# Patient Record
Sex: MALE | Race: WHITE
[De-identification: names, ages, dates, MRNs, and addresses within clinical notes are randomized per-mention and may not be internally consistent; named-entity substitution may affect disease eponyms.]

---

## 2018-04-30 ENCOUNTER — HOSPITAL ENCOUNTER (EMERGENCY)
Dept: HOSPITAL 80 - FED | Age: 68
Discharge: HOME | End: 2018-04-30
Payer: COMMERCIAL

## 2018-04-30 VITALS — DIASTOLIC BLOOD PRESSURE: 85 MMHG | SYSTOLIC BLOOD PRESSURE: 160 MMHG

## 2018-04-30 DIAGNOSIS — R09.1: Primary | ICD-10-CM

## 2018-04-30 LAB — PLATELET # BLD: 167 10^3/UL (ref 150–400)

## 2018-04-30 NOTE — EDPHY
H & P


Stated Complaint: EXPOSURE TO WEED KILLER YESTERDAY/BURNING/CP WITH DEEP BREATH/

EYES BURNING


Time Seen by Provider: 04/30/18 18:25


HPI/ROS: 





CHIEF COMPLAINT:  Multiple complaints including dyspnea, burning eyes and nose 

after being exposed to a weed killer





HISTORY OF PRESENT ILLNESS:  The patient presents to the ED with a 2 day 

history of burning eyes, nose and mild dyspnea after being exposed to a weed 

killer 2 days ago.  The patient denies any prior history of cardiac disease.  

He has no history of exertional chest pain or shortness of breath.  The patient 

takes no regular medications.  The patient is also having symptoms of a slight 

dry mouth, anxious and symptoms of fatigue.  The patient denies fever or cough.

  He has no complaints of congestion.  The patient denies additional acute 

complaints.  He does have a history of mild chronic diarrhea.





REVIEW OF SYSTEMS:


A comprehensive 10 point review of systems is otherwise negative aside from 

elements mentioned in the history of present illness.


Source: Patient





- Personal History


Current Tetanus/Diphtheria Vaccine: No





- Medical/Surgical History


Hx Asthma: No


Hx Chronic Respiratory Disease: No


Hx Diabetes: No


Hx Cardiac Disease: No


Hx Renal Disease: No


Hx Cirrhosis: No


Hx Alcoholism: No


Hx HIV/AIDS: No


Hx Splenectomy or Spleen Trauma: No


Other PMH: DENIES





- Social History


Smoking Status: Never smoked





- Physical Exam


Exam: 





General Appearance:  Alert, no distress


Eyes:  Pupils equal and round no pallor or injection


ENT, Mouth:  Mucous membranes moist


Respiratory:  There are no retractions, lungs are clear to auscultation


Cardiovascular:  Regular rate and rhythm


Gastrointestinal:  Abdomen is soft and nontender, no masses, bowel sounds normal


Neurological:  A&O, normal motor function, normal sensory exam, normal cranial 

nerves


Skin:  Warm and dry, no rashes


Musculoskeletal:  Neck is supple nontender


Extremities:  symmetrical, full range of motion








Constitutional: 


 Initial Vital Signs











Temperature (C)  37.2 C   04/30/18 18:08


 


Heart Rate  69   04/30/18 18:08


 


Respiratory Rate  18   04/30/18 18:08


 


Blood Pressure  158/98 H  04/30/18 18:08


 


O2 Sat (%)  98   04/30/18 18:08








 











O2 Delivery Mode               Room Air














Allergies/Adverse Reactions: 


 





Sulfa (Sulfonamide Antibiotics) Allergy (Verified 04/30/18 18:06)


 








Home Medications: 














 Medication  Instructions  Recorded


 


NK [No Known Home Meds]  04/30/18














Medical Decision Making





- Diagnostics


EKG Interpretation: 





EKG:  Complete interpretation has been separately recorded in the [facility] 

archive.  Summary impression:  Sinus rhythm, rate 80, LVH, nonspecific ST T 

wave changes noted


Imaging Results: 


Chest x-ray PA lateral:  Images reviewed by myself, hyperinflation, no focal 

infiltrate noted


ED Course/Re-evaluation: 





The patient presents to the ED with dyspnea, nasal burning, vague chest 

discomfort in the setting of a possible exposure to weed killer.  The patient's 

vital signs are stable.  The patient's EKG demonstrates no evidence of 

ischemia.  The patient's chest x-ray demonstrates no evidence of acute disease.

  The patient has no history of exertional chest pain.  He has no risk factors 

for coronary artery disease.  I see no evidence of an obvious toxidrome.  I do 

feel the patient be discharged home in stable condition at this point time.





The patient has been referred to our on-call cardiologist for any ongoing mild 

chest pain, dyspnea or exertional symptoms.  He is advised to return to the ED 

for markedly worsening symptoms of chest pain or difficulty breathing.








Differential Diagnosis: 





Differential diagnosis considered includes acute coronary syndrome, toxidrome, 

pneumonia, arrhythmia, pericarditis, myocardial infarction





- Data Points


Laboratory Results: 


 Laboratory Results





 04/30/18 18:25 





 04/30/18 18:25 





 











  04/30/18 04/30/18





  18:25 18:25


 


WBC    5.22 10^3/uL 10^3/uL





    (3.80-9.50) 


 


RBC    4.93 10^6/uL 10^6/uL





    (4.40-6.38) 


 


Hgb    16.0 g/dL g/dL





    (13.7-17.5) 


 


Hct    46.5 % %





    (40.0-51.0) 


 


MCV    94.3 fL fL





    (81.5-99.8) 


 


MCH    32.5 pg pg





    (27.9-34.1) 


 


MCHC    34.4 g/dL g/dL





    (32.4-36.7) 


 


RDW    13.1 % %





    (11.5-15.2) 


 


Plt Count    167 10^3/uL 10^3/uL





    (150-400) 


 


MPV    10.8 fL fL





    (8.7-11.7) 


 


Neut % (Auto)    56.9 % %





    (39.3-74.2) 


 


Lymph % (Auto)    28.9 % %





    (15.0-45.0) 


 


Mono % (Auto)    10.5 % %





    (4.5-13.0) 


 


Eos % (Auto)    2.7 % %





    (0.6-7.6) 


 


Baso % (Auto)    0.8 % %





    (0.3-1.7) 


 


Nucleat RBC Rel Count    0.0 % %





    (0.0-0.2) 


 


Absolute Neuts (auto)    2.97 10^3/uL 10^3/uL





    (1.70-6.50) 


 


Absolute Lymphs (auto)    1.51 10^3/uL 10^3/uL





    (1.00-3.00) 


 


Absolute Monos (auto)    0.55 10^3/uL 10^3/uL





    (0.30-0.80) 


 


Absolute Eos (auto)    0.14 10^3/uL 10^3/uL





    (0.03-0.40) 


 


Absolute Basos (auto)    0.04 10^3/uL 10^3/uL





    (0.02-0.10) 


 


Absolute Nucleated RBC    0.00 10^3/uL 10^3/uL





    (0-0.01) 


 


Immature Gran %    0.2 % %





    (0.0-1.1) 


 


Immature Gran #    0.01 10^3/uL 10^3/uL





    (0.00-0.10) 


 


Sodium  140 mEq/L mEq/L  





   (135-145)  


 


Potassium  4.0 mEq/L mEq/L  





   (3.5-5.2)  


 


Chloride  103 mEq/L mEq/L  





   ()  


 


Carbon Dioxide  27 mEq/l mEq/l  





   (22-31)  


 


Anion Gap  10 mEq/L mEq/L  





   (8-16)  


 


BUN  15 mg/dL mg/dL  





   (7-23)  


 


Creatinine  0.7 mg/dL mg/dL  





   (0.7-1.3)  


 


Estimated GFR  > 60   





   


 


Glucose  133 mg/dL H mg/dL  





   ()  


 


Calcium  9.8 mg/dL mg/dL  





   (8.5-10.4)  


 


Troponin I  0.015 ng/mL ng/mL  





   (0.000-0.034)  














Departure





- Departure


Disposition: Home, Routine, Self-Care


Clinical Impression: 


 Dyspnea, Pleurisy





Condition: Good


Instructions:  Chest Pain (ED)


Additional Instructions: 


1.  Please return to the ED for any worsening chest pain, difficulty breathing 

or other concerns.


2. I do recommend following up with your primary care provider for any ongoing 

mild symptoms.


3. If you continue to experience a symptoms of exertional dyspnea or chest 

discomfort you should follow up with the cardiologist, Dr. Moshe Alex, you 

have been referred to this week for further evaluation.








Referrals: 


Leonard Hercules MD [Primary Care Provider] - As per Instructions


Je Alex MD [Medical Doctor] - As per Instructions

## 2018-04-30 NOTE — CPEKG
Heart Rate: 80

RR Interval: 750

P-R Interval: 140

QRSD Interval: 76

QT Interval: 376

QTC Interval: 434

P Axis: 64

QRS Axis: 74

T Wave Axis: 56

EKG Severity - ABNORMAL ECG -

EKG Impression: SINUS RHYTHM

EKG Impression: PROBABLE LEFT VENTRICULAR HYPERTROPHY

Electronically Signed By: Armando Sal 30-Apr-2018 20:06:20